# Patient Record
Sex: MALE | Race: WHITE | NOT HISPANIC OR LATINO | Employment: FULL TIME | URBAN - METROPOLITAN AREA
[De-identification: names, ages, dates, MRNs, and addresses within clinical notes are randomized per-mention and may not be internally consistent; named-entity substitution may affect disease eponyms.]

---

## 2019-07-31 ENCOUNTER — TRANSCRIBE ORDERS (OUTPATIENT)
Dept: ADMINISTRATIVE | Age: 29
End: 2019-07-31

## 2019-07-31 ENCOUNTER — APPOINTMENT (OUTPATIENT)
Dept: RADIOLOGY | Age: 29
End: 2019-07-31
Payer: COMMERCIAL

## 2019-07-31 DIAGNOSIS — R05.9 COUGH: Primary | ICD-10-CM

## 2019-07-31 DIAGNOSIS — R05.9 COUGH: ICD-10-CM

## 2019-07-31 PROCEDURE — 71046 X-RAY EXAM CHEST 2 VIEWS: CPT

## 2020-10-16 ENCOUNTER — HOSPITAL ENCOUNTER (EMERGENCY)
Facility: HOSPITAL | Age: 30
Discharge: HOME | End: 2020-10-17
Attending: EMERGENCY MEDICINE | Admitting: EMERGENCY MEDICINE
Payer: COMMERCIAL

## 2020-10-16 DIAGNOSIS — R17 SERUM TOTAL BILIRUBIN ELEVATED: Primary | ICD-10-CM

## 2020-10-16 DIAGNOSIS — H57.89 ABNORMAL EYE COLOR: ICD-10-CM

## 2020-10-16 PROCEDURE — 99284 EMERGENCY DEPT VISIT MOD MDM: CPT | Performed by: PHYSICIAN ASSISTANT

## 2020-10-16 PROCEDURE — 99283 EMERGENCY DEPT VISIT LOW MDM: CPT

## 2020-10-17 ENCOUNTER — APPOINTMENT (EMERGENCY)
Dept: CT IMAGING | Facility: HOSPITAL | Age: 30
End: 2020-10-17
Payer: COMMERCIAL

## 2020-10-17 VITALS
OXYGEN SATURATION: 97 % | TEMPERATURE: 98 F | SYSTOLIC BLOOD PRESSURE: 136 MMHG | DIASTOLIC BLOOD PRESSURE: 88 MMHG | HEART RATE: 66 BPM | RESPIRATION RATE: 18 BRPM | WEIGHT: 172.2 LBS | BODY MASS INDEX: 24.02 KG/M2

## 2020-10-17 LAB
ALBUMIN SERPL BCP-MCNC: 4.5 G/DL (ref 3.5–5)
ALP SERPL-CCNC: 47 U/L (ref 46–116)
ALT SERPL W P-5'-P-CCNC: 28 U/L (ref 12–78)
ANION GAP SERPL CALCULATED.3IONS-SCNC: 9 MMOL/L (ref 4–13)
APAP SERPL-MCNC: <2 UG/ML (ref 10–20)
APTT PPP: 27 SECONDS (ref 23–37)
AST SERPL W P-5'-P-CCNC: 20 U/L (ref 5–45)
BASOPHILS # BLD AUTO: 0.06 THOUSANDS/ΜL (ref 0–0.1)
BASOPHILS NFR BLD AUTO: 1 % (ref 0–1)
BILIRUB SERPL-MCNC: 2.7 MG/DL (ref 0.2–1)
BILIRUB UR QL STRIP: NEGATIVE
BUN SERPL-MCNC: 12 MG/DL (ref 5–25)
CALCIUM SERPL-MCNC: 9.4 MG/DL (ref 8.3–10.1)
CHLORIDE SERPL-SCNC: 103 MMOL/L (ref 100–108)
CLARITY UR: CLEAR
CO2 SERPL-SCNC: 27 MMOL/L (ref 21–32)
COLOR UR: YELLOW
CREAT SERPL-MCNC: 1.18 MG/DL (ref 0.6–1.3)
EOSINOPHIL # BLD AUTO: 0.25 THOUSAND/ΜL (ref 0–0.61)
EOSINOPHIL NFR BLD AUTO: 4 % (ref 0–6)
ERYTHROCYTE [DISTWIDTH] IN BLOOD BY AUTOMATED COUNT: 12 % (ref 11.6–15.1)
GFR SERPL CREATININE-BSD FRML MDRD: 83 ML/MIN/1.73SQ M
GLUCOSE SERPL-MCNC: 97 MG/DL (ref 65–140)
GLUCOSE UR STRIP-MCNC: NEGATIVE MG/DL
HCT VFR BLD AUTO: 41.4 % (ref 36.5–49.3)
HGB BLD-MCNC: 14.2 G/DL (ref 12–17)
HGB UR QL STRIP.AUTO: NEGATIVE
IMM GRANULOCYTES # BLD AUTO: 0.01 THOUSAND/UL (ref 0–0.2)
IMM GRANULOCYTES NFR BLD AUTO: 0 % (ref 0–2)
INR PPP: 0.98 (ref 0.84–1.19)
KETONES UR STRIP-MCNC: ABNORMAL MG/DL
LEUKOCYTE ESTERASE UR QL STRIP: NEGATIVE
LIPASE SERPL-CCNC: 135 U/L (ref 73–393)
LYMPHOCYTES # BLD AUTO: 1.75 THOUSANDS/ΜL (ref 0.6–4.47)
LYMPHOCYTES NFR BLD AUTO: 30 % (ref 14–44)
MCH RBC QN AUTO: 30.5 PG (ref 26.8–34.3)
MCHC RBC AUTO-ENTMCNC: 34.3 G/DL (ref 31.4–37.4)
MCV RBC AUTO: 89 FL (ref 82–98)
MONOCYTES # BLD AUTO: 0.44 THOUSAND/ΜL (ref 0.17–1.22)
MONOCYTES NFR BLD AUTO: 8 % (ref 4–12)
NEUTROPHILS # BLD AUTO: 3.36 THOUSANDS/ΜL (ref 1.85–7.62)
NEUTS SEG NFR BLD AUTO: 57 % (ref 43–75)
NITRITE UR QL STRIP: NEGATIVE
NRBC BLD AUTO-RTO: 0 /100 WBCS
PH UR STRIP.AUTO: 6 [PH]
PLATELET # BLD AUTO: 311 THOUSANDS/UL (ref 149–390)
PMV BLD AUTO: 8.6 FL (ref 8.9–12.7)
POTASSIUM SERPL-SCNC: 3.8 MMOL/L (ref 3.5–5.3)
PROT SERPL-MCNC: 7.5 G/DL (ref 6.4–8.2)
PROT UR STRIP-MCNC: NEGATIVE MG/DL
PROTHROMBIN TIME: 13.1 SECONDS (ref 11.6–14.5)
RBC # BLD AUTO: 4.66 MILLION/UL (ref 3.88–5.62)
SODIUM SERPL-SCNC: 139 MMOL/L (ref 136–145)
SP GR UR STRIP.AUTO: 1.01 (ref 1–1.03)
UROBILINOGEN UR QL STRIP.AUTO: 0.2 E.U./DL
WBC # BLD AUTO: 5.87 THOUSAND/UL (ref 4.31–10.16)

## 2020-10-17 PROCEDURE — 83690 ASSAY OF LIPASE: CPT | Performed by: PHYSICIAN ASSISTANT

## 2020-10-17 PROCEDURE — 80329 ANALGESICS NON-OPIOID 1 OR 2: CPT | Performed by: PHYSICIAN ASSISTANT

## 2020-10-17 PROCEDURE — 85025 COMPLETE CBC W/AUTO DIFF WBC: CPT | Performed by: PHYSICIAN ASSISTANT

## 2020-10-17 PROCEDURE — 85730 THROMBOPLASTIN TIME PARTIAL: CPT | Performed by: PHYSICIAN ASSISTANT

## 2020-10-17 PROCEDURE — 85610 PROTHROMBIN TIME: CPT | Performed by: PHYSICIAN ASSISTANT

## 2020-10-17 PROCEDURE — 36415 COLL VENOUS BLD VENIPUNCTURE: CPT | Performed by: PHYSICIAN ASSISTANT

## 2020-10-17 PROCEDURE — 81003 URINALYSIS AUTO W/O SCOPE: CPT | Performed by: PHYSICIAN ASSISTANT

## 2020-10-17 PROCEDURE — 80053 COMPREHEN METABOLIC PANEL: CPT | Performed by: PHYSICIAN ASSISTANT

## 2020-10-17 RX ORDER — DIPHENOXYLATE HYDROCHLORIDE AND ATROPINE SULFATE 2.5; .025 MG/1; MG/1
1 TABLET ORAL DAILY
COMMUNITY

## 2020-10-17 RX ORDER — AMOXICILLIN 500 MG
2400 CAPSULE ORAL DAILY
COMMUNITY

## 2024-01-26 ENCOUNTER — OFFICE VISIT (OUTPATIENT)
Dept: NEUROLOGY | Facility: CLINIC | Age: 34
End: 2024-01-26
Payer: COMMERCIAL

## 2024-01-26 VITALS
RESPIRATION RATE: 16 BRPM | TEMPERATURE: 97.5 F | HEIGHT: 71 IN | SYSTOLIC BLOOD PRESSURE: 122 MMHG | BODY MASS INDEX: 24.36 KG/M2 | WEIGHT: 174 LBS | DIASTOLIC BLOOD PRESSURE: 72 MMHG | OXYGEN SATURATION: 98 % | HEART RATE: 59 BPM

## 2024-01-26 DIAGNOSIS — H54.61 VISION LOSS OF RIGHT EYE: ICD-10-CM

## 2024-01-26 DIAGNOSIS — G43.801 OCULAR MIGRAINE WITH STATUS MIGRAINOSUS: Primary | ICD-10-CM

## 2024-01-26 PROCEDURE — 99205 OFFICE O/P NEW HI 60 MIN: CPT | Performed by: PSYCHIATRY & NEUROLOGY

## 2024-01-26 RX ORDER — MULTIVIT WITH MINERALS/LUTEIN
1000 TABLET ORAL EVERY OTHER DAY
COMMUNITY

## 2024-01-26 RX ORDER — LANOLIN ALCOHOL/MO/W.PET/CERES
1 CREAM (GRAM) TOPICAL DAILY
COMMUNITY

## 2024-01-26 NOTE — PROGRESS NOTES
Patient ID: Gui Echevarria is a 33 y.o. male.    Assessment/Plan:           Problem List Items Addressed This Visit          Cardiovascular and Mediastinum    Ocular migraine with status migrainosus - Primary    Relevant Medications    Cyanocobalamin 1000 MCG SUBL    Other Relevant Orders    MRI brain and orbits wo and w contrast    BUN    Creatinine, serum     Other Visit Diagnoses       Vision loss of right eye        Relevant Orders    MRI brain and orbits wo and w contrast           Mr. Echevarria has presented to [facility] [place] for evaluation of visual dysfunction and related questions.    Patient describes developing upper respiratory infection since November 25 with persistent headache or bothersome around the same time.  Patient had single instance of right eye to vision loss with evaluation of ophthalmology brought concern for red color desaturation at that eye with retro-orbital pressure being described.  Patient also reported the whole week instance where patient was having visual disturbances involving both eyes with complete resolution of his symptoms being reported today.    Considering clinical presentation, patient likely experiencing ocular migraine but having underlying infection condition also may bring concern for infectious optic neuritis at that time.  Patient will be advised MRI brain and orbits to complete his workup.  No steroids advised that patient is to start vitamin B12 sublingual supplements.  Patient was offered aspirin 81 mg chewable tablets if he has another event of vision loss.    We personally reviewed MRI brain/orbits and MRA head and carotid arteries which patient completed in 2015 for right eye vision loss, patient did not have any intracranial or ocular pathology at that time other than arachnoid cyst.    Patient has no other focal neurological deficits, patient has hyperreflexia which is symmetric.    Postinfectious brain fog has resolved.    Patient is to  follow with [facility] neurology 1 week MRI completed and available for my review    Subjective: complains of loss of vision the beginning of January. Headaches were almost daily up until a few days ago. And he was also experiencing Brain fog, and some sleep disturbance.    HPI    Mr. Echevarria is a 33-year-old male who presented to [facility] multiple sclerosis center for evaluation.    Patient presented to neurology with complaints of visual dysfunction involving his right eye such as vision loss lasting from 30 seconds up to 1 minute.  Patient also states that he may experiencing changes in the vision such as blurriness sometimes worse sometimes better lasting for few weeks with much worse vision reported at nighttime.  Patient spends 12 hours in front of the computer due to his work-related tasks.  Patient agreed that he is not well since November 25 when he started developing sinus infection which spread to his lung infection with no treatment provided as patient did not seek any medical attention.  Patient stated January time he had fever and got upper respiratory infection again where he was sick for 2 weeks and finally started improving relatively recently.  Patient stated he developed brain fog with muscle achiness and not feeling well for 3 weeks.  Patient described having headache around the same time as retro-orbital pain pressure sensation shooting to the vertex.  Pain was 1/10 today but range over the pain was 3-4/10 up to 4-5/10 considering previous experience was kidney stones 9/10 pain.    Patient had completed brain MRI with MRI of the orbits when he had vision loss back in 2015.  Patient has MRI and MRA angiogram as well as MRI of the carotids with no pathology been appreciated in intracranial vessels as well as carotid arteries.    Patient has no other sensorimotor dysfunction upper or lower extremity, patient has no facial asymmetry, no dysphagia, dysarthria, no disbalance.    The following  "portions of the patient's history were reviewed and updated as appropriate: He  has no past medical history on file.  He   Patient Active Problem List    Diagnosis Date Noted    Ocular migraine with status migrainosus 01/26/2024     He  has no past surgical history on file.  His family history is not on file.  He  reports that he has never smoked. He has never used smokeless tobacco. He reports current alcohol use. He reports that he does not use drugs.  Current Outpatient Medications   Medication Sig Dispense Refill    Ascorbic Acid (vitamin C) 1000 MG tablet Take 1,000 mg by mouth every other day      calcium citrate-vitamin D 315 mg-5 mcg tablet Take 1 tablet by mouth daily 5000 units      CREATINE PO Take 1,250 mg by mouth 2 (two) times a day      Cyanocobalamin 1000 MCG SUBL Place 1 tablet (1,000 mcg total) under the tongue daily 90 tablet 0    multivitamin (THERAGRAN) TABS Take 1 tablet by mouth daily      Omega-3 Fatty Acids (Fish Oil) 1200 MG CAPS Take 2,400 mg by mouth daily        No current facility-administered medications for this visit.     Current Outpatient Medications on File Prior to Visit   Medication Sig    Ascorbic Acid (vitamin C) 1000 MG tablet Take 1,000 mg by mouth every other day    calcium citrate-vitamin D 315 mg-5 mcg tablet Take 1 tablet by mouth daily 5000 units    CREATINE PO Take 1,250 mg by mouth 2 (two) times a day    multivitamin (THERAGRAN) TABS Take 1 tablet by mouth daily    Omega-3 Fatty Acids (Fish Oil) 1200 MG CAPS Take 2,400 mg by mouth daily      No current facility-administered medications on file prior to visit.     He has No Known Allergies..         Objective:    Blood pressure 122/72, pulse 59, temperature 97.5 °F (36.4 °C), temperature source Temporal, resp. rate 16, height 5' 11\" (1.803 m), weight 78.9 kg (174 lb), SpO2 98%.    Physical Exam    Neurological Exam    CONSTITUTIONAL: NAD, pleasant. NECK: supple, no lymphadenopathy, no thyromegaly, no JVD. " CARDIOVASCULAR: RRR, normal S1S2, no murmurs, no rubs. RESP: clear to auscultation bilaterally, no wheezes/rhonchi/rales. ABDOMEN: soft, non tender, non distended. SKIN: no rash or skin lesions. EXTREMITIES: no edema, pulses 2+bilaterally. PSYCH: appropriate mood and affect  NEUROLOGIC COMPREHENSIVE EXAM: Patient is oriented to person, place and time, NAD; appropriate affect. CN II, III, IV, V, VI, VII,VIII,IX,X,XI-XII intact with EOMI, PERRLA, OKN intact, VF grossly intact, fundi poorly visualized secondary to pupillary constriction; symmetric face noted. Motor: 5/5 UE/LE bilateral symmetric; Sensory: intact to light touch and pinprick bilaterally; normal vibration sensation feet bilaterally; Coordination within normal limits on FTN and DAVID testing; DTR: 2/4 through, no Babinski, no clonus. Tandem gait is intact. Romberg: absent.  ROS:    Review of Systems   Constitutional:  Negative for appetite change, fatigue and fever.   HENT: Negative.  Negative for hearing loss, tinnitus, trouble swallowing and voice change.    Eyes: Negative.  Negative for photophobia, pain and visual disturbance.   Respiratory: Negative.  Negative for shortness of breath.    Cardiovascular: Negative.  Negative for palpitations.   Gastrointestinal: Negative.  Negative for nausea and vomiting.   Endocrine: Negative.  Negative for cold intolerance.   Genitourinary: Negative.  Negative for dysuria, frequency and urgency.   Musculoskeletal:  Negative for back pain, gait problem, myalgias, neck pain and neck stiffness.   Skin: Negative.  Negative for rash.   Allergic/Immunologic: Negative.    Neurological:  Positive for headaches (Bilateral Retrorbital, R temple). Negative for dizziness, tremors, seizures, syncope, facial asymmetry, speech difficulty, weakness, light-headedness and numbness.        Brainfog   Hematological: Negative.  Does not bruise/bleed easily.   Psychiatric/Behavioral:  Positive for sleep disturbance. Negative for confusion  and hallucinations.

## 2024-02-13 DIAGNOSIS — H54.61 VISION LOSS OF RIGHT EYE: ICD-10-CM

## 2024-02-13 DIAGNOSIS — G43.801 OCULAR MIGRAINE WITH STATUS MIGRAINOSUS: Primary | ICD-10-CM

## 2024-02-23 ENCOUNTER — HOSPITAL ENCOUNTER (OUTPATIENT)
Dept: MRI IMAGING | Facility: HOSPITAL | Age: 34
Discharge: HOME | End: 2024-02-23
Attending: PSYCHIATRY & NEUROLOGY
Payer: COMMERCIAL

## 2024-02-23 DIAGNOSIS — H54.61 VISION LOSS OF RIGHT EYE: ICD-10-CM

## 2024-02-23 DIAGNOSIS — G43.801 OCULAR MIGRAINE WITH STATUS MIGRAINOSUS: ICD-10-CM

## 2024-02-23 PROCEDURE — A9585 GADOBUTROL INJECTION: HCPCS | Performed by: FAMILY MEDICINE

## 2024-02-23 PROCEDURE — 70553 MRI BRAIN STEM W/O & W/DYE: CPT

## 2024-02-23 PROCEDURE — 70543 MRI ORBT/FAC/NCK W/O &W/DYE: CPT

## 2024-02-23 PROCEDURE — G1004 CDSM NDSC: HCPCS

## 2024-02-23 RX ORDER — GADOBUTROL 604.72 MG/ML
7.5 INJECTION INTRAVENOUS
Status: COMPLETED | OUTPATIENT
Start: 2024-02-23 | End: 2024-02-23

## 2024-02-23 RX ADMIN — GADOBUTROL 7.5 ML: 604.72 INJECTION INTRAVENOUS at 20:46

## 2024-03-04 ENCOUNTER — TELEMEDICINE (OUTPATIENT)
Dept: NEUROLOGY | Facility: CLINIC | Age: 34
End: 2024-03-04
Payer: COMMERCIAL

## 2024-03-04 VITALS — HEIGHT: 71 IN | WEIGHT: 167 LBS | BODY MASS INDEX: 23.38 KG/M2

## 2024-03-04 DIAGNOSIS — G43.801 OCULAR MIGRAINE WITH STATUS MIGRAINOSUS: Primary | ICD-10-CM

## 2024-03-04 PROCEDURE — 99213 OFFICE O/P EST LOW 20 MIN: CPT | Performed by: PSYCHIATRY & NEUROLOGY

## 2024-03-04 NOTE — PROGRESS NOTES
Virtual Regular Visit    Verification of patient location:    Patient is located at Other in the following state in which I hold an active license [place]      Assessment/Plan:    Problem List Items Addressed This Visit          Cardiovascular and Mediastinum    Ocular migraine with status migrainosus - Primary            Reason for visit is FU  Chief Complaint   Patient presents with    Virtual Regular Visit          Encounter provider Shikha Allen MD    Provider located at [address] [place] [address] [facility] [address] [place] [address]      Recent Visits  No visits were found meeting these conditions.  Showing recent visits within past 7 days and meeting all other requirements  Today's Visits  Date Type Provider Dept   03/04/24 Telemedicine Shikha Allen MD [facility]   Showing today's visits and meeting all other requirements  Future Appointments  No visits were found meeting these conditions.  Showing future appointments within next 150 days and meeting all other requirements       The patient was identified by name and date of birth. Gui Echevarria was informed that this is a telemedicine visit and that the visit is being conducted through the Epic Embedded platform. He agrees to proceed..  My office door was closed. No one else was in the room.  He acknowledged consent and understanding of privacy and security of the video platform. The patient has agreed to participate and understands they can discontinue the visit at any time.    Patient is aware this is a billable service.     Subjective  Gui Echevarria is a 33 y.o. male with visual dysfunction. .      HPI   Mr. Echevarria has presented to [facility] multiple sclerosis center for follow up on visual dysfunction and related questions.  Since last office visit, patient describes no new sensorimotor dysfunction, no visual dysfunction, no double vision no vision loss.  Patient reports no  significant headache.  Patient states that his vision in his right eye has been back to normal with no visual phenomena has been reported.    Patient previously describes developing upper respiratory infection since November 25 with persistent headache or bothersome around the same time.  Patient had single instance of right eye to vision loss with evaluation of ophthalmology brought concern for red color desaturation at that eye with retro-orbital pressure being described.  Patient also reported the whole week instance where patient was having visual disturbances involving both eyes with complete resolution of his symptoms being reported today.     Considering clinical presentation, patient likely experiencing ocular migraine but having underlying infection condition also may bring concern for infectious optic neuritis at that time.  Patient will be advised MRI brain and orbits to complete his workup.  No steroids advised that patient is to start vitamin B12 sublingual supplements.  Patient was offered aspirin 81 mg chewable tablets if he has another event of vision loss.     We personally reviewed MRI brain/orbits and MRA head and carotid arteries which patient completed in 2015 for right eye vision loss, patient did not have any intracranial or ocular pathology at that time other than arachnoid cyst.       ASSESSMENT AND PLAN     Mr. Echevarria has presented to [facility] multiple sclerosis center for follow-up on visual dysfunction in addition to cognitive dysfunction started after upper respiratory infection in November 2023.  Previous evaluation was consistent with low B12 levels were patient was offered sublingual B12 supplements.  Due to visual complaints, we brought concern for ocular migraine which also were reported in previous years in 2015 with comprehensive relation at that time was nonrevealing.  Patient is here today to discuss brain MRI and orbits findings, we personally reviewed the patient imaging  patient has no acute or chronic ischemic or hemorrhagic changes, no signs of neoplasm, no signs of CNS demyelination.  Optic nerves and optic chiasm has been unremarkable with no signs of infectious/inflammatory component at all.  Patient is to finish vitamin B12 supplements for 6 months total.    Patient is to follow-up with [facility] neurology on as-needed basis.     History reviewed. No pertinent past medical history.    History reviewed. No pertinent surgical history.    Current Outpatient Medications   Medication Sig Dispense Refill    Ascorbic Acid (vitamin C) 1000 MG tablet Take 1,000 mg by mouth every other day      calcium citrate-vitamin D 315 mg-5 mcg tablet Take 1 tablet by mouth daily 5000 units      CREATINE PO Take 1,250 mg by mouth 2 (two) times a day      Cyanocobalamin 1000 MCG SUBL Place 1 tablet (1,000 mcg total) under the tongue daily 90 tablet 0    multivitamin (THERAGRAN) TABS Take 1 tablet by mouth daily      Omega-3 Fatty Acids (Fish Oil) 1200 MG CAPS Take 2,400 mg by mouth daily        No current facility-administered medications for this visit.        No Known Allergies    Review of Systems   Constitutional:  Negative for appetite change, fatigue and fever.   HENT: Negative.  Negative for hearing loss, tinnitus, trouble swallowing and voice change.    Eyes: Negative.  Negative for photophobia, pain and visual disturbance.   Respiratory: Negative.  Negative for shortness of breath.    Cardiovascular: Negative.  Negative for palpitations.   Gastrointestinal: Negative.  Negative for nausea and vomiting.   Endocrine: Negative.  Negative for cold intolerance.   Genitourinary: Negative.  Negative for dysuria, frequency and urgency.   Musculoskeletal:  Negative for back pain, gait problem, myalgias, neck pain and neck stiffness.   Skin: Negative.  Negative for rash.   Allergic/Immunologic: Negative.    Neurological: Negative.  Negative for dizziness, tremors, seizures, syncope, facial asymmetry,  "speech difficulty, weakness, light-headedness, numbness and headaches.   Hematological: Negative.  Does not bruise/bleed easily.   Psychiatric/Behavioral: Negative.  Negative for confusion, hallucinations and sleep disturbance.        Video Exam    Vitals:    03/04/24 1157   Weight: 75.8 kg (167 lb)   Height: 5' 11\" (1.803 m)       Physical Exam     Visit Time  Total Visit Duration: 5 min        "